# Patient Record
Sex: MALE | Race: BLACK OR AFRICAN AMERICAN | NOT HISPANIC OR LATINO | URBAN - METROPOLITAN AREA
[De-identification: names, ages, dates, MRNs, and addresses within clinical notes are randomized per-mention and may not be internally consistent; named-entity substitution may affect disease eponyms.]

---

## 2020-02-20 ENCOUNTER — EMERGENCY (EMERGENCY)
Facility: HOSPITAL | Age: 25
LOS: 1 days | Discharge: HOME | End: 2020-02-20
Attending: EMERGENCY MEDICINE
Payer: MEDICAID

## 2020-02-20 VITALS
SYSTOLIC BLOOD PRESSURE: 127 MMHG | HEART RATE: 65 BPM | RESPIRATION RATE: 18 BRPM | TEMPERATURE: 99 F | OXYGEN SATURATION: 99 % | DIASTOLIC BLOOD PRESSURE: 79 MMHG

## 2020-02-20 VITALS
RESPIRATION RATE: 18 BRPM | HEART RATE: 74 BPM | TEMPERATURE: 98 F | HEIGHT: 72 IN | SYSTOLIC BLOOD PRESSURE: 153 MMHG | WEIGHT: 216.93 LBS | DIASTOLIC BLOOD PRESSURE: 77 MMHG | OXYGEN SATURATION: 100 %

## 2020-02-20 PROCEDURE — 93005 ELECTROCARDIOGRAM TRACING: CPT

## 2020-02-20 PROCEDURE — 99283 EMERGENCY DEPT VISIT LOW MDM: CPT

## 2020-02-20 PROCEDURE — 99284 EMERGENCY DEPT VISIT MOD MDM: CPT

## 2020-02-20 PROCEDURE — 71046 X-RAY EXAM CHEST 2 VIEWS: CPT | Mod: 26

## 2020-02-20 PROCEDURE — 71046 X-RAY EXAM CHEST 2 VIEWS: CPT

## 2020-02-20 PROCEDURE — 93010 ELECTROCARDIOGRAM REPORT: CPT

## 2020-02-20 RX ORDER — LIDOCAINE 4 G/100G
1 CREAM TOPICAL ONCE
Refills: 0 | Status: COMPLETED | OUTPATIENT
Start: 2020-02-20 | End: 2020-02-20

## 2020-02-20 RX ORDER — IBUPROFEN 200 MG
600 TABLET ORAL ONCE
Refills: 0 | Status: COMPLETED | OUTPATIENT
Start: 2020-02-20 | End: 2020-02-20

## 2020-02-20 RX ADMIN — LIDOCAINE 1 PATCH: 4 CREAM TOPICAL at 16:36

## 2020-02-20 RX ADMIN — Medication 600 MILLIGRAM(S): at 15:32

## 2020-02-20 NOTE — ED PROVIDER NOTE - PATIENT PORTAL LINK FT
You can access the FollowMyHealth Patient Portal offered by [facility] by registering at the following website: [email or web address]. By joining [facility]’s FollowMyHealth portal, you will also be able to view your health information using other applications (apps) compatible with our system.

## 2020-02-20 NOTE — ED PROVIDER NOTE - OBJECTIVE STATEMENT
23 yo M with no pmh presents with chest pressure x 1 day. Patient states he was leaning down when he felt pain at his left lower chest. Stabbing in nature. Moderate severity.  It is worse with moving. No SOB or trouble breathing. No fever, nausea, diaphoresis, numbness, tingling.

## 2020-02-20 NOTE — ED ADULT NURSE NOTE - CHPI ED NUR SYMPTOMS NEG
no congestion/no fever/no nausea/no syncope/no shortness of breath/no dizziness/no chills/no diaphoresis/no vomiting/no back pain

## 2020-02-20 NOTE — ED ADULT NURSE NOTE - NSIMPLEMENTINTERV_GEN_ALL_ED
Implemented All Universal Safety Interventions:  [place] to call system. Call bell, personal items and telephone within reach. Instruct patient to call for assistance. Room bathroom lighting operational. Non-slip footwear when patient is off stretcher. Physically safe environment: no spills, clutter or unnecessary equipment. Stretcher in lowest position, wheels locked, appropriate side rails in place.

## 2020-02-20 NOTE — ED PROVIDER NOTE - ATTENDING CONTRIBUTION TO CARE
------------ATTENDING NOTE------------ ------------ATTENDING NOTE------------  healthy vaccinated pt w/ sudden onset mild/moderate stabbing chest wall pain in mid/L chest, started after bending getting dressed, no radiation, only occurs w/ bending/twisting and deep inspiration, no SOB/dyspnea at rest, no numbness/weakness, no recent fevers/illness of cough, soft benign abd, clear chest w/o distress, nml cardiac exam w/ equal distal pulses, no edema/calf tenderness, nml VS in ED and NSR during cardiac monitoring, c/w msk related pain, in depth dw pt about ddx, tx, jackson, continued close outpt fu.  - Justo Madden MD   -----------------------------------------------

## 2020-02-20 NOTE — ED ADULT NURSE NOTE - OBJECTIVE STATEMENT
c/o chest pain x 5 days but worse today. Pt states, "I had a dull chest pain that would come and go for the past few days but today it was sharper and more constant. I just came to get checked out to make sure its nothing." Pt endorses dull, constant chest pain with intermittent sharp pain 5/10. Pt denies radiating pain, headache, blurred vision, dizziness, SOB abdominal pain, N/V/D, urinary symptoms, numbness and tingling.

## 2020-02-20 NOTE — ED ADULT TRIAGE NOTE - HEIGHT IN CM
ED Bronchodilator Protocol Severity Index     Breathlessness: With activity (1)  Talks in: Full sentences (1)  Respiratory rate: Within normal values or slightly increased at times (1)  Breath sounds: Loud expiratory wheeze, decreased aeration (2)  Pulse rate: > 100 (2)  SpO2: 90-94% (2)  PEFR: Unable to perform (3)    Severity Index score:       Moderate (10-17 points)    5.0 mg Albuterol / .5 mg Ipratropium Bromide
182.88

## 2020-02-20 NOTE — ED PROVIDER NOTE - NS ED ROS FT
Gen: Denies fever,   CV: Denies palpitations, +chest pain  HEENT: Denies neck pain, sore throat,   Skin: Denies rash, erythema, color changes  Resp: Denies SOB, cough  GI: Denies nausea, vomiting  Msk: Denies back pain, LE swelling, extremity pain  Neuro: Denies LOC, weakness, seizures  Psych: Denies hx of psych, hallucinations, SI

## 2020-02-20 NOTE — ED PROVIDER NOTE - NSFOLLOWUPINSTRUCTIONS_ED_ALL_ED_FT
See your Primary Doctor this week for follow up -- call to discuss.    Acetaminophen and/or Ibuprofen as directed for pain -- see medication warnings.    See COSTOCHONDRITIS information and return instructions given to you.    Seel immediate medical care for new/worsening symptoms/concerns.

## 2020-04-26 ENCOUNTER — MESSAGE (OUTPATIENT)
Age: 25
End: 2020-04-26

## 2020-09-15 PROBLEM — Z00.00 ENCOUNTER FOR PREVENTIVE HEALTH EXAMINATION: Status: ACTIVE | Noted: 2020-09-15

## 2021-04-03 NOTE — ED PROVIDER NOTE - PHYSICAL EXAMINATION
Gen: Alert and oriented. Lying comfortably in bed. Answering questions appropriately  HEENT: extra occular movements intact, no nasal discharge, mucous membranes moist  CV: Regular rate and rhythm, +S1/S2, no murmurs/rubs/gallops,   Resp: Clear to ausculation bilaterally, no wheezes/rhonchi/rales  GI: Abdomen soft non-distended, non tender to palpation, no masses  MSK: Chest wall tenderness on palpation which reproduces pain. No open wounds, no bruising, no LE edema  Neuro: A&Ox4, following commands, moving all four extremities spontaneously  Psych: appropriate mood
CHEST DISCOMFORT

## 2021-08-09 NOTE — ED ADULT NURSE NOTE - CAS ELECT INFOMATION PROVIDED
Hematology / Oncology Progress Note    Subjective      Intubated 8/7  Blood cultures + for VRE     Objective     Last Recorded Vitals  Blood pressure 100/65, pulse (!) 132, temperature 98.2 °F (36.8 °C), temperature source Temporal, resp. rate 15, height 6' (1.829 m), weight 69.9 kg (154 lb 1.6 oz), SpO2 100 %.  Body mass index is 20.9 kg/m².        Physical Exam   General: no acute distress, ill appearing, cachectic, intubated   HEENT DARRIUS, dry mucosa   NECK symmetric, no palpable mass   CV RRR, S1 and S2 normal, no murmurs, pulses are palpable, + peripheral edema   Respiratory breathing is not labored, coarse BS BL   GI soft, , non distended,  MS no clubbing, no joint deformity   Skin warm, dry, no rash or visible lesions   Psych sedated      Vital Signs:    Visit Vitals  /65   Pulse (!) 132   Temp 98.2 °F (36.8 °C) (Temporal)   Resp 15   Ht 6' (1.829 m)   Wt 69.9 kg (154 lb 1.6 oz)   SpO2 100%   BMI 20.90 kg/m²         Labs  WBC (K/mcL)   Date Value   08/09/2021 5.3     RBC (mil/mcL)   Date Value   08/09/2021 3.31 (L)     HCT (%)   Date Value   08/09/2021 30.8 (L)     HGB (g/dL)   Date Value   08/09/2021 9.8 (L)     PLT (K/mcL)   Date Value   08/09/2021 19 (LL)      Sodium (mmol/L)   Date Value   08/09/2021 144     Potassium (mmol/L)   Date Value   08/09/2021 4.0     Chloride (mmol/L)   Date Value   08/09/2021 114 (H)     Carbon Dioxide (mmol/L)   Date Value   08/09/2021 24     BUN (mg/dL)   Date Value   08/09/2021 33 (H)     Creatinine (mg/dL)   Date Value   08/09/2021 1.05      GOT/AST (Units/L)   Date Value   08/09/2021 199 (H)     GPT/ALT (Units/L)   Date Value   08/09/2021 62     No results found for: GGTP  Alkaline Phosphatase (Units/L)   Date Value   08/09/2021 202 (H)     Bilirubin, Total (mg/dL)   Date Value   08/09/2021 1.6 (H)       Imaging    XR CHEST PA OR AP 1 VIEW   Final Result   FINDINGS/IMPRESSION:      The ET tube terminates approximately 5.4 cm above the olga lidia. Enteric tube   projects  below the diaphragm into the left upper abdomen the projection of   the stomach.       There is a left-sided central venous catheter with the tip taking a turn   within the proximal SVC and may be oriented towards the origin of the   azygos vein.      Chest tube is seen within the left lower lung. Scattered interstitial   opacities likely represent a combination of infection/edema.      Electronically Signed by: GILLES RIVAS M.D.    Signed on: 8/7/2021 2:06 PM          XR CHEST PA OR AP 1 VIEW   Final Result   Improvement in the left hemithorax opacities with stable right opacities.      Suspected minimal residual right-sided pneumothorax with indwelling chest   tube.      Change in position of the tip of the left central venous catheter which   appears to have flipped into the azygos vein. Clinical correlation advised   and suggest repositioning as indicated.      Electronically Signed by: TAN ZAMORA M.D.    Signed on: 8/7/2021 9:39 AM          XR CHEST PA OR AP 1 VIEW   Final Result       As above.         Electronically Signed by: DUSTY GALLOWAY M.D.    Signed on: 8/6/2021 4:57 PM          NM GI BLOOD LOSS   Final Result   Addendum 1 of 1   Addendum:      There is mild radiopharmaceutical accumulation on delayed phase imaging in   the area of the distal sigmoid colon/rectum which may represent slow    bleed,   possible venous/hemorrhoidal.      Electronically Signed by: MICHELE SWEENEY DO    Signed on: 8/6/2021 1:02 PM          Final   Negative exam.      Electronically Signed by: MICHELE SWEENEY DO    Signed on: 8/6/2021 12:18 PM          CTA ABDOMEN PELVIS W WO CONTRAST   Final Result         1. Limited exam due to motion artifact as well as contrast bolus timing.   Within this limitation grossly no active extravasation is seen. There is   mixed density fluid within the distal colon and rectum which likely   represents blood products. The distal colon and rectum does demonstrate a   mildly thickened wall  which may represent a component of colitis.   2. Moderate to extensive lung opacities in the bases as well as a small   right hydropneumothorax containing a pigtail catheter.      This report is in agreement with the stat interpretation.      Electronically Signed by: GILLES RIVAS M.D.    Signed on: 8/6/2021 7:36 AM          XR CHEST PA OR AP 1 VIEW   Final Result   FINDINGS/IMPRESSION:      Small right apical pneumothorax measures 2 cm on today's exam. A chest tube   is seen in the right lower chest. Patchy bilateral interstitial and   parenchymal opacities are identified and appear slightly worse within the   left lung on today's exam. These may represent a combination of   edema/infection. Enteric tube projects into the stomach. Central venous   catheter terminates in the lower SVC.      Electronically Signed by: GILLES RIVAS M.D.    Signed on: 8/6/2021 6:44 AM          CT CHEST WO CONTRAST   Final Result      1. Right-sided chest tube position as above. Small to moderate-sized right   pneumothorax.       2. Extensive bilateral airspace and groundglass opacities throughout the   lungs, suspicious for an infectious/inflammatory etiology. These have   progressed since 7/20/2021.        3. More confluent masslike opacity at the right lung apex appears slightly   more confluent compared to prior CT examination. Although this likely also   represents an infectious/inflammatory etiology, follow-up to ensure   resolution and exclude underlying mass.       4. Bilateral pleural effusions, right greater than left.       5. COPD.       6. Other findings as above.      Electronically Signed by: JENSEN MYERS M.D.    Signed on: 8/5/2021 7:42 AM          CT GUIDED NEEDLE BIOPSY BONE   Final Result         1.  CT-guided bone marrow aspiration as well as core bone biopsy as above.      Electronically Signed by: GILLES RIVAS M.D.    Signed on: 8/4/2021 1:54 PM          XR CHEST PA OR AP 1 VIEW   Final Result   Stable  appearance.               Electronically Signed by: ARIANA ARGUELLO M.D.    Signed on: 8/4/2021 6:54 AM          XR CHEST PA OR AP 1 VIEW   Final Result       Stable appearance of the right pneumothorax. No significant interval   change.         Electronically Signed by: JENSEN MYERS M.D.    Signed on: 8/3/2021 6:26 AM          XR CHEST PA OR AP 1 VIEW   Final Result       Right upper lobe pneumothorax unchanged.         Electronically Signed by: ARIANA ARGUELLO M.D.    Signed on: 8/2/2021 3:19 PM          XR CHEST PA OR AP 1 VIEW   Final Result       Right-sided pneumothorax upper lobe component has increase in size the   prior examination with a lateral component smaller. See above.         Electronically Signed by: ARIANA ARGUELLO M.D.    Signed on: 8/2/2021 7:03 AM          XR CHEST PA OR AP 1 VIEW   Final Result   1. Redemonstrated pigtail right chest tube with tip projecting over the   medial right lower hemithorax. There is a more apparent right   hydropneumothorax on the current exam when compared to the earlier 8/1/2021   exam, this may be related to the tube being of suction. Correlate with tube   status/output. The size of the right pneumothorax component is still   relatively small, below 15%.      2.  Superimposed opacities throughout the right lung may represent   combination of atelectasis, infiltrates and/or edematous changes.      3.  Enteric tube tip projects in the stomach in the left upper quadrant.    Nontunneled right internal jugular vein central venous catheter is stable   with tip in the SVC.      Electronically Signed by: MICHELE SWEENEY DO    Signed on: 8/1/2021 2:42 PM          XR CHEST PA OR AP 1 VIEW   Final Result       As above.         Electronically Signed by: DUSTY GALLOWAY M.D.    Signed on: 8/1/2021 6:38 AM          XR CHEST PA OR AP 1 VIEW   Final Result          1. Right-sided pneumothorax has intervally increased in size.         Electronically Signed by: DENISA QUIROZ    Signed  on: 7/31/2021 3:02 PM          XR CHEST PA OR AP 1 VIEW   Final Result          1. Stable lines and tubes.    2. Extensive bilateral airspace and interstitial opacities consistent with   ongoing Covid 19 infection.    3. A right apical pneumothorax has resolved. Small right basilar effusion.         Electronically Signed by: DENISA QUIROZ    Signed on: 7/31/2021 7:02 AM          XR CHEST PA OR AP 1 VIEW   Final Result   1.  Lines/tubes as above, with interval extubation.   2.  Slight progression of interstitial and alveolar opacities corresponding   to edema, hemorrhage, and infection, consistent with the given history of   confirmed positive Covid 19 viral illness.   3.  Small right pneumothorax appears slightly improved since the prior   exam.      Electronically Signed by: BLANCA GARRETT M.D.    Signed on: 7/30/2021 2:21 PM          XR CHEST PA OR AP 1 VIEW   Final Result      1. Lines and tubes as above.      2. Small right pneumothorax, slightly larger since the prior study.      3. Unchanged patchy infiltrates within the lungs.         Electronically Signed by: JENSEN MYERS M.D.    Signed on: 7/30/2021 8:15 AM          XR CHEST PA OR AP 1 VIEW   Final Result      1.  Lines and tubes as above.   2.  Mild interval increase in size of a small right-sided pneumothorax.   3.  Otherwise, no significant interval change with findings as described   above.      Electronically Signed by: HARRIET REYES M.D.    Signed on: 7/29/2021 6:28 AM          XR CHEST PA OR AP 1 VIEW   Final Result       1. Lines and tubes as above.      2. Small right pneumothorax, stable.      3. Bilateral lung opacities appear mildly improved.         Electronically Signed by: JENSEN MYERS M.D.    Signed on: 7/27/2021 9:20 AM          XR CHEST PA OR AP 1 VIEW   Final Result       As above.         Electronically Signed by: ARIANA ARGUELLO M.D.    Signed on: 7/24/2021 9:37 AM          XR CHEST PA OR AP 1 VIEW   Final Result   Right upper  lobe pneumothorax is smaller.      See above for other findings.      Electronically Signed by: ARIANA ARGUELLO M.D.    Signed on: 7/24/2021 8:23 AM          XR CHEST PA OR AP 1 VIEW   Final Result      1.  Stable to minimally decreased right-sided pneumothorax which was only   partially visualized previously.   2.  Otherwise, no significant interval change.      Electronically Signed by: HARRIET REYES M.D.    Signed on: 7/23/2021 8:58 AM          XR CHEST PA OR AP 1 VIEW   Final Result       Nasogastric tube tip in the stomach. The lung apices not included on the   current examination, there is a right upper lobe pneumothorax. Larger   compared to the prior study in which it measured 2.2 cm. Right-sided chest   tube is present. Extensive infiltrates bilaterally with consolidation.         Electronically Signed by: ARIANA ARGUELLO M.D.    Signed on: 7/22/2021 12:21 PM          US BILIARY TREE   Final Result   1.   Distended gallbladder with sludge and possible stones which are too   small to measure.     2.   No sonographic evidence of acute cholecystitis.   3.   No biliary ductal dilation.   4.   Hepatic steatosis      Electronically Signed by: BLANCA GARRETT M.D.    Signed on: 7/22/2021 11:47 AM          XR CHEST PA OR AP 1 VIEW   Final Result   1.  Lines/tubes as above.   2.  Mildly decreased size of the right pneumothorax with right chest tube   in place.   3.  Extensive interstitial and alveolar opacities consistent with edema,   infection, or hemorrhage, compatible with the given history of confirmed   positive Covid 19 viral illness.      Electronically Signed by: BLANCA GARRETT M.D.    Signed on: 7/22/2021 10:36 AM          XR CHEST PA OR AP 1 VIEW   Final Result   1.  Mildly increased size of the right pneumothorax with right chest tube   in place.   2.  Progression of extensive interstitial and alveolar opacities consistent   with edema, infection, or hemorrhage, consistent with the given history of    confirmed positive Covid 19 viral illness.      Julianne Luo RN, was notified of the findings over the telephone by Dr. Weber on 7/22/2021 at 10:30.      Electronically Signed by: BLANCA WEBER M.D.    Signed on: 7/22/2021 10:33 AM          [facility] EXTREMITY LOWER VENOUS DUPLEX   Final Result   There is no evidence of deep venous thrombosis in both lower   extremity interrogated veins.      Electronically Signed by: BLANCA WEBER M.D.    Signed on: 7/22/2021 8:02 AM          XR CHEST PA OR AP 1 VIEW   Final Result      1.  Interval increased consolidation in the lungs bilaterally suspicious   for worsening viral pneumonia given patient's clinical history of Covid   viral illness.   2.  Interval increased small right-sided pneumothorax.   3.  Small bilateral pleural effusions.   4.  Additional findings as described above.   5.  Continued follow-up is recommended.      Electronically Signed by: HARRIET REYES M.D.    Signed on: 7/21/2021 2:08 PM          XR CHEST PA OR AP 1 VIEW   Final Result       As above.         Electronically Signed by: DUSTY GALLOWAY M.D.    Signed on: 7/20/2021 10:20 PM          XR CHEST PA OR AP 1 VIEW   Final Result       As above.         Electronically Signed by: DUSTY GALLOWAY M.D.    Signed on: 7/20/2021 8:37 PM          CTA CHEST PULMONARY EMBOLISM W CONTRAST   Final Result      1.  No pulmonary embolism.      2.  Small bore right chest tube with small right pneumothorax.      3.  Opacities within the right upper lobe, left lower lobe, and minimally   in the lingula are suspicious for infection.      4.  Right middle and lower lobe opacities favor atelectasis but could be   secondary to infection as well.      5.  Moderate emphysema without lung mass or concerning lung nodule.      6.  Other findings detailed above.      Electronically Signed by: DUSTY GALLOWAY M.D.    Signed on: 7/20/2021 7:27 PM          XR CHEST PA OR AP 1 VIEW   Final Result   1.  Reexpansion of  the right lung status post chest tube placement, with   small residual right pneumothorax.   2.  Linear and hazy densities bilaterally can be seen with edema,   atelectasis, and infection. Follow-up with full inspiratory PA and lateral   radiographs to ensure resolution is recommended.      Electronically Signed by: BLANCA GARRETT M.D.    Signed on: 7/20/2021 6:57 PM          XR CHEST PA OR AP 1 VIEW   Final Result      1.  As above.                        Electronically Signed by: DUSTY GALLOWAY M.D.    Signed on: 7/20/2021 6:10 PM            Pathologic Diagnosis      Flow cytometry, bone marrow:    - No increase in blasts   - No aberrant antigenic expression   - No evidence of non-Hodgkin lymphoma     FINDINGS:   Flow cytometry analysis of the bone marrow shows a population of maturing myeloid cells with no increase in blasts or aberrant antigenic expression.  Gating on the lymphocytes shows predominantly T cells with a normal T to B cell ratio.  No CD5 or NZ40-[id number] B-cells are present. There are no monoclonal B-cells or T-cell antigenic abnormalities by markers assayed.       Findings should be correlated with morphological studies for complete assessment of this material (see separate report [id number]).      Electronically signed by Suraj Flores MD on 8/6/2021 at 1554      Bone Marrow Pathology Report                      Case: [id number]                                   Authorizing Provider:  Camelia Acevedo MD      Collected:           06/16/2021 10:05 AM           Ordering Location:     [address]               Received:            06/16/2021 10:27 AM           Pathologist:           Perla Orellana Mai, DO                                                       Specimens:   A) - Bone Marrow Clot                                                                                B) - Bone Marrow Core                                                                                C) - Bone Marrow  Aspirate                                                                            D) - Blood Peripheral                                                                      Final Diagnosis     Bone marrow, aspirate, clot section, and core biopsy:  -  Variably cellular marrow with maturing trilineage hematopoiesis.  -  Small reactive lymphoid aggregates and polyclonal plasmacytosis.  -  No morphologic evidence of lymphoma or high-grade myeloid neoplasm.  -  See description.     Peripheral blood smear:  -  Leukopenia with relative monocytosis and basophilia.  -  Normocytic normochromic anemia.  -  See description.      Electronically signed by Perla Orellana Mai, DO on 6/18/2021 at 11:29 AM   AP Comment     The bone marrow specimen is variably cellular for age with largely normocellular marrow (approximately 50% cellularity) and one fragment with markedly hypocellular marrow (less than 5%).  Due to the fragmented nature of the sample, it is not entirely clear if the hypocellular zone in this fragment is due to subcortical sampling, however, the stroma in this fragment does show pale eosinophilic matrix suggestive of gelatinous change, a finding which can be seen in association with malnutrition, infection (particularly AIDS), metabolic disorders or malignancy.  Immunohistochemistry highlighted small, reactive lymphoid aggregates and polyclonal plasmacytosis.  GMS and AFB stains did not reveal any distinct fungal elements or acid-fast organisms, respectively. CHIARA in situ hybridization did not reveal any Beena-Barr virus infected cells.  There is no significant dyspoiesis or increase in blasts.  Concurrent flow cytometric analysis did not show any evidence of non-Hodgkin lymphoma or high-grade myeloid neoplasm.  Taken together, the morphologic findings are nonspecific and likely secondary to HIV infection.  Correlation with clinical findings and relevant cytogenetic/molecular testing is required.                  Assessment & Plan     1. Severe pancytopenia   -Secondary to HIV & concomitant bacterial infection   -Records from [facility] reviewed   -Neutropenia/lymphopenia & anemia present since 06/21    -Bone marrow aspirate/biopsy done at [facility] 06/21 with no evidence of lymphoma, MM, primary Hem process but likely HIV-related morphologic changes   -Bone marrow cultures negative 06/21  -Bone marrow cytogenetics normal 06/21    -He received GSF with transient improvement of neutropenia   -Repeat flow cytometry from marrow is negative for leukemia/lymphoma   -Unclear if recovery will occur timely for clinical improvement   -Will follow repeat marrow path results   -S/P GCSF 300 mcg subQ on 8/8  -ANC 4.5 today from 0.5 yesterday  -Overall prognosis is very poor    2. Severe GI bleeding status post exploratory laparotomy sigmoidectomy colostomy   -H&H stable   -Platelet count stable     3. HIV & bacteremia  started on HAART therapy, on broad spectrum antibiotics. Unclear if counts will recover        Mesha Bull MD     8/9/2021  9:02 AM         DC instructions/by LAMAR Madden
